# Patient Record
Sex: MALE | URBAN - METROPOLITAN AREA
[De-identification: names, ages, dates, MRNs, and addresses within clinical notes are randomized per-mention and may not be internally consistent; named-entity substitution may affect disease eponyms.]

---

## 2018-03-16 ENCOUNTER — DOCUMENTATION ONLY (OUTPATIENT)
Dept: CARDIOLOGY | Facility: CLINIC | Age: 68
End: 2018-03-16

## 2018-03-16 NOTE — PROGRESS NOTES
Dr. Jamey Flaherty sent records/film to Dr. Robison for evaluation of MR and possible MitraClip procedure.  [facility] reviewed by  and Dr. Marysol Raines.  Moderate MR per echo.  No role for MitraClip at this time due to Functional MR.  Dr. Robison spoke with Dr. Flaherty and he will continue to manage him medically until symptoms worsen.  Dr. Flaherty will follow with [facility] and notify Dr. Robison when his MR becomes severe.

## 2024-03-18 ENCOUNTER — TELEPHONE (OUTPATIENT)
Dept: NEUROSURGERY | Facility: CLINIC | Age: 74
End: 2024-03-18

## 2024-03-18 NOTE — TELEPHONE ENCOUNTER
Received faxed referral from [place] for lesion in left posterior fossa. MRI brain - 2/21/24.    Called to schedule appt x 2  - no answer. Left VM asking pt to return call to schedule. Provided call back number